# Patient Record
(demographics unavailable — no encounter records)

---

## 2025-05-19 NOTE — ASSESSMENT
[FreeTextEntry1] : 19 yo male with mild intermittent right scrotal pain in the setting varicoceles.  I explained that varicoceles can cause scrotal ache and can be a cause of male factor infertility.  There is generally no need for surgical intervention for any pain symptoms.  I recommend NSAIDs PRN and scrotal support.  He will return as needed.

## 2025-05-19 NOTE — HISTORY OF PRESENT ILLNESS
[FreeTextEntry1] : 21 yo male represents with complaints of right scrotal pain for the last 2 months.  The pain is intermittent and mild.  There is no pattern to when he has pain, although it seems to be at the end of the day.  He was seen by a local physician in [facility] where a scrotal US was performed.  The report is not available for review, but per the patient, the US showed a small varicocele.  Thee rest of the US was normal.   I also advised that he start screening for prostate cancer at age 40 given his father's hx of cancer.

## 2025-05-19 NOTE — PHYSICAL EXAM
[Normal Appearance] : normal appearance [General Appearance - In No Acute Distress] : no acute distress [Edema] : no peripheral edema [] : no respiratory distress [Abdomen Soft] : soft [Abdomen Tenderness] : non-tender [Abdomen Hernia] : no hernia was discovered [Urethral Meatus] : meatus normal [Penis Abnormality] : normal circumcised penis [Epididymis] : the epididymides were normal [Testes Tenderness] : no tenderness of the testes [Testes Mass (___cm)] : there were no testicular masses [Normal Station and Gait] : the gait and station were normal for the patient's age [Skin Color & Pigmentation] : normal skin color and pigmentation [No Focal Deficits] : no focal deficits [Oriented To Time, Place, And Person] : oriented to person, place, and time [Not Anxious] : not anxious [de-identified] : Small B/L varicocele with valsalva